# Patient Record
Sex: FEMALE | Race: WHITE | URBAN - METROPOLITAN AREA
[De-identification: names, ages, dates, MRNs, and addresses within clinical notes are randomized per-mention and may not be internally consistent; named-entity substitution may affect disease eponyms.]

---

## 2020-10-13 ENCOUNTER — EMERGENCY (EMERGENCY)
Facility: HOSPITAL | Age: 70
LOS: 1 days | Discharge: HOME | End: 2020-10-13
Attending: EMERGENCY MEDICINE | Admitting: EMERGENCY MEDICINE
Payer: MEDICARE

## 2020-10-13 VITALS
RESPIRATION RATE: 18 BRPM | SYSTOLIC BLOOD PRESSURE: 223 MMHG | HEART RATE: 108 BPM | TEMPERATURE: 99 F | OXYGEN SATURATION: 98 % | DIASTOLIC BLOOD PRESSURE: 119 MMHG | HEIGHT: 65 IN | WEIGHT: 160.06 LBS

## 2020-10-13 VITALS
OXYGEN SATURATION: 97 % | DIASTOLIC BLOOD PRESSURE: 127 MMHG | RESPIRATION RATE: 17 BRPM | SYSTOLIC BLOOD PRESSURE: 223 MMHG | TEMPERATURE: 99 F | HEART RATE: 87 BPM

## 2020-10-13 DIAGNOSIS — S52.92XA UNSPECIFIED FRACTURE OF LEFT FOREARM, INITIAL ENCOUNTER FOR CLOSED FRACTURE: ICD-10-CM

## 2020-10-13 DIAGNOSIS — I10 ESSENTIAL (PRIMARY) HYPERTENSION: ICD-10-CM
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
## 2020-10-13 DIAGNOSIS — S52.612A DISPLACED FRACTURE OF LEFT ULNA STYLOID PROCESS, INITIAL ENCOUNTER FOR CLOSED FRACTURE: ICD-10-CM
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
## 2020-10-13 DIAGNOSIS — Z88.0 ALLERGY STATUS TO PENICILLIN: ICD-10-CM

## 2020-10-13 DIAGNOSIS — M25.532 PAIN IN LEFT WRIST: ICD-10-CM
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
## 2020-10-13 PROCEDURE — 99284 EMERGENCY DEPT VISIT MOD MDM: CPT

## 2020-10-13 PROCEDURE — 73130 X-RAY EXAM OF HAND: CPT | Mod: 26,LT

## 2020-10-13 PROCEDURE — 73130 X-RAY EXAM OF HAND: CPT

## 2020-10-13 PROCEDURE — 73070 X-RAY EXAM OF ELBOW: CPT

## 2020-10-13 PROCEDURE — 73110 X-RAY EXAM OF WRIST: CPT

## 2020-10-13 PROCEDURE — 99283 EMERGENCY DEPT VISIT LOW MDM: CPT | Mod: 25

## 2020-10-13 PROCEDURE — 25605 CLTX DST RDL FX/EPHYS SEP W/: CPT

## 2020-10-13 PROCEDURE — 73100 X-RAY EXAM OF WRIST: CPT | Mod: 26,LT

## 2020-10-13 PROCEDURE — 29125 APPL SHORT ARM SPLINT STATIC: CPT | Mod: 59

## 2020-10-13 PROCEDURE — 20605 DRAIN/INJ JOINT/BURSA W/O US: CPT | Mod: 59

## 2020-10-13 PROCEDURE — 73110 X-RAY EXAM OF WRIST: CPT | Mod: 26

## 2020-10-13 PROCEDURE — 73070 X-RAY EXAM OF ELBOW: CPT | Mod: 26,LT

## 2020-10-13 PROCEDURE — 99284 EMERGENCY DEPT VISIT MOD MDM: CPT | Mod: 25

## 2020-10-13 PROCEDURE — 73100 X-RAY EXAM OF WRIST: CPT

## 2020-10-13 PROCEDURE — 73070 X-RAY EXAM OF ELBOW: CPT | Mod: 26

## 2020-10-13 PROCEDURE — 73130 X-RAY EXAM OF HAND: CPT | Mod: 26

## 2020-10-13 RX ORDER — ACETAMINOPHEN 500 MG
1000 TABLET ORAL ONCE
Refills: 0 | Status: COMPLETED | OUTPATIENT
Start: 2020-10-13 | End: 2020-10-13

## 2020-10-13 RX ORDER — AMLODIPINE BESYLATE 2.5 MG/1
10 TABLET ORAL ONCE
Refills: 0 | Status: COMPLETED | OUTPATIENT
Start: 2020-10-13 | End: 2020-10-13

## 2020-10-13 NOTE — ED PROVIDER NOTE - CARE PROVIDER_API CALL
Compa Johnson  ORTHOPAEDIC SURGERY  [address], [address] [place] [address]  Phone: [phone]  Fax: [phone]  Follow Up Time:

## 2020-10-13 NOTE — ED PROVIDER NOTE - CARE PLAN
Principal Discharge DX:	Radius fracture   Principal Discharge DX:	Radius fracture  Secondary Diagnosis:	Essential hypertension

## 2020-10-13 NOTE — ED PROVIDER NOTE - PROGRESS NOTE DETAILS
now also refusing BP med , pt understands risks, says she doesn't like taking pills, advised pcp f/u and low salt diet and discussed signs of HTN emergency and risks of untreated HTN,   ortho called for consultation for displaced fx

## 2020-10-13 NOTE — ED PROVIDER NOTE - NSPTACCESSSVCSAPPTDETAILS_ED_ALL_ED_FT
follow up with ortho in 1-1.5 wks     follow up with your primary care as soon as possible regarding your blood pressure

## 2020-10-13 NOTE — ED PROVIDER NOTE - CLINICAL SUMMARY MEDICAL DECISION MAKING FREE TEXT BOX
69 y/o F presents to the ED s/p mechanical fall 4 days ago w/ left wrist injury. Will get XR to evaluate for fx. Suspect fx. Will likely need ortho consultation pending XR results. If no fx, will advise outpt ortho f/u. In addition, pt w/ extremely high bp, although pt is asymptomatic. Will recheck and initiate treatment if bp still over 200. Advised w/u for possibly HTN urgency, although doubt as pt is asymptomatic. Pt refuses this w/u at this time and understands risks. Reports recently having a very salty meal w/ sausage. Discussed decrease of salt intake and f/u w/ PMD as she will likely need chronic meds. 71 y/o F presents to the ED s/p mechanical fall 4 days ago w/ left wrist injury. Will get XR to evaluate for fx. Suspect fx. Will likely need ortho consultation pending XR results. If no fx, will advise outpt ortho f/u. In addition, pt w/ extremely high bp, although pt is asymptomatic. Will recheck and initiate treatment if bp still over 200. Advised w/u for possibly HTN urgency, although doubt as pt is asymptomatic. Pt refuses this w/u at this time and understands risks. Reports recently having a very salty meal w/ sausage. Discussed decrease of salt intake and f/u w/ PMD as she will likely need chronic meds. Will consider initiation here due to level of bp as well as hx of known HTN.

## 2020-10-13 NOTE — ED PROVIDER NOTE - OBJECTIVE STATEMENT
69 y/o F with PMhx untreated HTN, reports most recently at PMD her systolic bp was 140. Presents to the ED after a mechanical fall 4 days ago onto her outstretched left wrist. Reports pain at the wrist, discomfort worse with movement, with associated swelling. Denies numbness or other injuries. States other than mild wrist pain pt feels perfectly well. Denies headache, chest pain, or SOB.

## 2020-10-13 NOTE — ED PROVIDER NOTE - PATIENT PORTAL LINK FT
You can access the FollowMyHealth Patient Portal offered by [facility] by registering at the following website: [email or web address]. By joining [employer]’s FollowMyHealth portal, you will also be able to view your health information using other applications (apps) compatible with our system.

## 2020-10-13 NOTE — ED ADULT NURSE NOTE - OBJECTIVE STATEMENT
patient states that on Saturday she was feeding her kittens and tripped and fell on her left forearm and hand. patient with swelling to hand and bruising to forearm. denies numbness/tingling. pulses intact. limited TIERRA hand grasp due to swelling. pain when flexing wrist, limited extension. she states that she is aware that her BP is high however does not want this addressed. patient educated on importance of a primary care provider. she has not seen a doctor in years. denies headache, dizziness, chest pain, sob. affected wrist in brace.

## 2020-10-13 NOTE — ED PROVIDER NOTE - MUSCULOSKELETAL, MLM
Diffuse tenderness at the left wrist, w/ edema. No bony tenderness of the hand. Radial pulse intact. Radial, ulnar, median nerves intact. Motor function and sensation intact. Elbow nontender w/ FROM. Spine appears normal.

## 2020-10-13 NOTE — ED PROVIDER NOTE - X-RAY INTERPRETATION
ER physician/xray : wrist radius displaced fx, ulnar styloid fx, hand no fx or dislocation, elbow no fx or dislocation, soft tissue juma at wrist

## 2020-10-13 NOTE — CONSULT NOTE ADULT - SUBJECTIVE AND OBJECTIVE BOX
Orthopaedic Surgery Consult Note    Attending Physician: Dr. Johnson   Consult requested by: Dr. Rushing    CC: Left wrist pain    HPI:  Patient is a 70y old  Female who presents with a chief complaint of left wrist pain. She was trying to feed some stray cats outside when she lost her balance and fell on an outstretched wrist 3 days ago. She immediately had pain, but did not want to come to the ED due to cost. She went to see her neighbor who is an EMT who told her to go to the ED and gave her a splint, but she declined. Today, she noticed her wrist was still swollen and painful, so she came to the ED. She has pain aggravated by movement, alleviated by rest. There is no numbness or radiation of pain. She has not been taking anything for the pain.     She also presented with significantly high blood pressure. She denies any chest pain, shortness of breath, blurry vision, headaches. She denies a history of hypertension or any medications she has ever taken for this.      Allergies:  penicillin (Unknown reaction)      PAST MEDICAL & SURGICAL HISTORY:  Pt denies  No significant past surgical history    Family History:  Denies family history of bleeding disorders or other orthopedic issues    Social History:   Pt is a nonsmoker  Social EtOH use   Denies illicit drug use    Physical Exam:     Vital Signs Last 24 Hrs  T(C): 37.2 (13 Oct 2020 13:17), Max: 37.2 (13 Oct 2020 13:17)  T(F): 99 (13 Oct 2020 13:17), Max: 99 (13 Oct 2020 13:17)  HR: 93 (13 Oct 2020 13:17) (93 - 108)  BP: 213/135 (13 Oct 2020 13:17) (213/135 - 223/119)  RR: 18 (13 Oct 2020 13:17) (18 - 18)  SpO2: 96% (13 Oct 2020 13:17) (96% - 98%)    Constitutional: vitals reviewed per nursing documentation, NAD, lying comfortably in stretcher   Lungs: not using accessory muscles of respiration, normal respiratory effort  Neuro/Psych: A&Ox3, normal affect and mood  MSK: antalgic gait  LUE - swelling present over volar and dorsal forearm as well as the hand, sensation intact to light touch, 5/5 , 2+ radial pulse, no open wounds/erythema, tenderness at Bailee's tubercle, no obvious deformity, no tenderness of snuffbox/hand/forearm/elbow, pain with wrist ROM, instability of distal radius  RUE - sensation intact to light touch, 5/5 /biceps/triceps strength, 2+ radial pulse, no open wounds/erythema/ecchymoses, no tenderness through upper extremity, FROM without pain, no instability or laxity of joints    Imaging: AP, lateral and oblique views of the wrist and hand demonstrate extraarticular, comminuted distal radius fracture with dorsal tilt and shortening of the distal radius as well as a nondisplaced ulnar styloid fracture. No other fractures or dislocations are seen. There is joint space narrowing and osteophytes at the thumb carpometacarpal joint.     Procedure Note: Patient given hematoma block of 8 cc of 1% lidocaine without epinephrine into the fracture. The wrist was hung in traction and closed reduced under fluoroscopic guidance. A sugartong splint was applied with a mold. Post-reduction xrays demonstrated more adequate alignment of the fracture. Patient tolerated the procedure well and remained neurovasculary intact post-reduction.     A/P: 69yo Female with no known PMH and a left DR fx and ulnar styloid fracture that occurred from a mechanical fall 3 days ago. We discussed closed reduction and splinting risks/benefits including pain, skin tear, need for future procedures, etc. Patient understood and agreed to proceed.   - Reduction performed (see procedure information above)  - NWB LUE   - Pain control as needed  - Patient advised to keep splint CDI and fu with Dr. Johnson in 3-5 days.   - Given strict ED return precautions including numbness/tingling, severe pain, etc. Pt expressed understanding.   - Recommend patient follows up with her PCP for BP check this week due to significantly high BP in ED. She agreed.   - Discussed with Dr. Johnson    Ortho Pager [phone]

## 2020-10-13 NOTE — ED ADULT NURSE NOTE - PSH
Okay to continue Lasix 20 mg daily as per hospital discharge summary until seen by me.     No significant past surgical history

## 2020-10-13 NOTE — ED PROVIDER NOTE - NSFOLLOWUPINSTRUCTIONS_ED_ALL_ED_FT
Wrist Fracture in Adults    WHAT YOU NEED TO KNOW:    A wrist fracture is a break in one or more of the bones in your wrist.     Adult Arm Bones         DISCHARGE INSTRUCTIONS:    Seek care immediately if:   •Your pain gets worse or does not get better after you take pain medicine.      •Your cast or splint breaks, gets wet, or is damaged.      •Your hand or fingers feel numb or cold.      •Your hand or fingers turn white or blue.      •Your splint or cast feels too tight.      •You have more pain or swelling after the cast or splint is put on.      Call your doctor if:   •You have a fever.      •There is a foul smell or blood coming from under the cast.      •You have questions or concerns about your condition or care.      Medicines: You may need any of the following:   •Prescription pain medicine may be given. Ask your healthcare provider how to take this medicine safely. Some prescription pain medicines contain acetaminophen. Do not take other medicines that contain acetaminophen without talking to your healthcare provider. Too much acetaminophen may cause liver damage. Prescription pain medicine may cause constipation. Ask your healthcare provider how to prevent or treat constipation.       •NSAIDs, such as ibuprofen, help decrease swelling, pain, and fever. NSAIDs can cause stomach bleeding or kidney problems in certain people. If you take blood thinner medicine, always ask your healthcare provider if NSAIDs are safe for you. Always read the medicine label and follow directions.      •Acetaminophen decreases pain and fever. It is available without a doctor's order. Ask how much to take and how often to take it. Follow directions. Read the labels of all other medicines you are using to see if they also contain acetaminophen, or ask your doctor or pharmacist. Acetaminophen can cause liver damage if not taken correctly. Do not use more than 4 grams (4,000 milligrams) total of acetaminophen in one day.       •Take your medicine as directed. Contact your healthcare provider if you think your medicine is not helping or if you have side effects. Tell him or her if you are allergic to any medicine. Keep a list of the medicines, vitamins, and herbs you take. Include the amounts, and when and why you take them. Bring the list or the pill bottles to follow-up visits. Carry your medicine list with you in case of an emergency.      Self-care:   •Rest as much as possible. Do not play contact sports until the healthcare provider says it is okay.       •Apply ice on your wrist for 15 to 20 minutes every hour or as directed. Use an ice pack, or put crushed ice in a plastic bag. Cover it with a towel before you place it on your skin. Ice helps prevent tissue damage and decreases swelling and pain.      •Elevate your wrist above the level of your heart as often as possible. This will help decrease swelling and pain. Prop your wrist on pillows or blankets to keep it elevated comfortably.             Cast or splint care:   •You may take a bath or shower as directed. Do not let your cast or splint get wet. Before bathing, cover the cast or splint with 2 plastic trash bags. Tape the bags to your skin above the cast or splint to seal out the water. Keep your arm out of the water in case the bag breaks. If a plaster cast gets wet and soft, call your healthcare provider.      •Check the skin around the cast or splint every day. You may put lotion on any red or sore areas.      •Do not push down or lean on the cast or brace because it may break.      •Do not scratch the skin under the cast by putting a sharp or pointed object inside the cast.      Go to physical therapy as directed: You may need physical therapy after your wrist heals and the cast is removed. A physical therapist can teach you exercises to help improve movement and strength and to decrease pain.    Follow up with your doctor or bone specialist as directed: You may need to return to have your cast removed. You may also need an x-ray to check how well the bone has healed. Write down your questions so you remember to ask them during your visits. Wrist Fracture in Adults    WHAT YOU NEED TO KNOW:    A wrist fracture is a break in one or more of the bones in your wrist.     Adult Arm Bones         DISCHARGE INSTRUCTIONS:    Seek care immediately if:   •Your pain gets worse or does not get better after you take pain medicine.    Hypertension    Hypertension, commonly called high blood pressure, is when the force of blood pumping through your arteries is too strong. Hypertension forces your heart to work harder to pump blood. Your arteries may become narrow or stiff. Having untreated or uncontrolled hypertension for a long period of time can cause heart attack, stroke, kidney disease, and other problems. If started on a medication, take exactly as prescribed by your health care professional. Maintain a healthy lifestyle and follow up with your primary care physician.    SEEK IMMEDIATE MEDICAL CARE IF YOU HAVE ANY OF THE FOLLOWING SYMPTOMS: severe headache, confusion, chest pain, abdominal pain, vomiting, or shortness of breath.       •Your cast or splint breaks, gets wet, or is damaged.      •Your hand or fingers feel numb or cold.      •Your hand or fingers turn white or blue.      •Your splint or cast feels too tight.      •You have more pain or swelling after the cast or splint is put on.      Call your doctor if:   •You have a fever.      •There is a foul smell or blood coming from under the cast.      •You have questions or concerns about your condition or care.      Medicines: You may need any of the following:   •Prescription pain medicine may be given. Ask your healthcare provider how to take this medicine safely. Some prescription pain medicines contain acetaminophen. Do not take other medicines that contain acetaminophen without talking to your healthcare provider. Too much acetaminophen may cause liver damage. Prescription pain medicine may cause constipation. Ask your healthcare provider how to prevent or treat constipation.       •NSAIDs, such as ibuprofen, help decrease swelling, pain, and fever. NSAIDs can cause stomach bleeding or kidney problems in certain people. If you take blood thinner medicine, always ask your healthcare provider if NSAIDs are safe for you. Always read the medicine label and follow directions.      •Acetaminophen decreases pain and fever. It is available without a doctor's order. Ask how much to take and how often to take it. Follow directions. Read the labels of all other medicines you are using to see if they also contain acetaminophen, or ask your doctor or pharmacist. Acetaminophen can cause liver damage if not taken correctly. Do not use more than 4 grams (4,000 milligrams) total of acetaminophen in one day.       •Take your medicine as directed. Contact your healthcare provider if you think your medicine is not helping or if you have side effects. Tell him or her if you are allergic to any medicine. Keep a list of the medicines, vitamins, and herbs you take. Include the amounts, and when and why you take them. Bring the list or the pill bottles to follow-up visits. Carry your medicine list with you in case of an emergency.      Self-care:   •Rest as much as possible. Do not play contact sports until the healthcare provider says it is okay.       •Apply ice on your wrist for 15 to 20 minutes every hour or as directed. Use an ice pack, or put crushed ice in a plastic bag. Cover it with a towel before you place it on your skin. Ice helps prevent tissue damage and decreases swelling and pain.      •Elevate your wrist above the level of your heart as often as possible. This will help decrease swelling and pain. Prop your wrist on pillows or blankets to keep it elevated comfortably.             Cast or splint care:   •You may take a bath or shower as directed. Do not let your cast or splint get wet. Before bathing, cover the cast or splint with 2 plastic trash bags. Tape the bags to your skin above the cast or splint to seal out the water. Keep your arm out of the water in case the bag breaks. If a plaster cast gets wet and soft, call your healthcare provider.      •Check the skin around the cast or splint every day. You may put lotion on any red or sore areas.      •Do not push down or lean on the cast or brace because it may break.      •Do not scratch the skin under the cast by putting a sharp or pointed object inside the cast.      Go to physical therapy as directed: You may need physical therapy after your wrist heals and the cast is removed. A physical therapist can teach you exercises to help improve movement and strength and to decrease pain.    Follow up with your doctor or bone specialist as directed: You may need to return to have your cast removed. You may also need an x-ray to check how well the bone has healed. Write down your questions so you remember to ask them during your visits.

## 2020-10-13 NOTE — ED ADULT TRIAGE NOTE - OTHER COMPLAINTS
Patient reports she lost her balance and fell. Denies any headache, dizziness, weakness, chest pain or shortness of breath. Patient states "I just want a x-ray, I don't want to hear about my blood pressure."

## 2020-10-13 NOTE — ED ADULT NURSE NOTE - INTERVENTIONS DEFINITIONS
Physically safe environment: no spills, clutter or unnecessary equipment/[place] to call system/Instruct patient to call for assistance

## 2025-01-06 NOTE — ED ADULT TRIAGE NOTE - BP NONINVASIVE SYSTOLIC (MM HG)
SUBJECTIVE:   74 y.o.  female here for pelvic organ prolapse. Pt reports episodes of severe constipation and intermittent diarrhea. Pt states she removed her #3 ring pessary several months ago and then she was unable to replace it. Pt was seen in office and due to constipation, the pessary could not be replaced and pt was sent for US. Pt also reports increased burping and abdominal bloating. Pt reports she does not have a \"good diet\" and pt has not been seen by GI for her complaints.     Review of Systems:  General ROS: negative  Respiratory ROS: no cough, shortness of breath, or wheezing  Cardiovascular ROS: no chest pain or dyspnea on exertion  Gastrointestinal ROS: no abdominal pain, change in bowel habits, or black or bloody stools  Genito-Urinary ROS: no dysuria, trouble voiding, or hematuria    OBJECTIVE:   /64   Ht 1.626 m (5' 4\")   Wt 69.4 kg (153 lb)   BMI 26.26 kg/m²     Physical Exam:  GEN: She appears well, afebrile.   HEENT: normal cephalic, atraumatic  CVS: regular rate and rhythm  ABDOMEN: benign, soft, nontender, no masses.  MUSCULOSKELETAL: normal gait, no masses    Pelvic Exam:   EFG: normal external genitalia, atrophy noted  VAGINA: atrophic changes, pale mucosa, cystocele, rectocele and uterine prolapse reduced on exam  PERINEUM: normal appearing without lesions or masses  ANUS: normal appearing without lesions or masses, no fissures or hemorrhoids    ASSESSMENT:   Uterine prolapse    PLAN:   Pt with shortened vagina but pt can tolerate a #2 ring pessary. Pt agrees to trial of pessary again.   Pt to return for pessary placement  Referral to GI   223